# Patient Record
Sex: FEMALE
[De-identification: names, ages, dates, MRNs, and addresses within clinical notes are randomized per-mention and may not be internally consistent; named-entity substitution may affect disease eponyms.]

---

## 2020-06-22 ENCOUNTER — NURSE TRIAGE (OUTPATIENT)
Dept: OTHER | Facility: CLINIC | Age: 50
End: 2020-06-22

## 2020-11-13 ENCOUNTER — NURSE TRIAGE (OUTPATIENT)
Dept: OTHER | Facility: CLINIC | Age: 50
End: 2020-11-13

## 2020-11-13 NOTE — TELEPHONE ENCOUNTER
P[facility]  [facility]  Sore throat, 7/10 pain  Exposed to strep throat one week ago    Reason for Disposition   [1] Sore throat AND [2] strep throat EXPOSURE (i.e., meets definition) within past 10 days    Answer Assessment - Initial Assessment Questions  1. STREP EXPOSURE: \"Was the exposure to someone who lives within your home? \" If not, ask: \"How much contact did you have with the sick individual?\"       Does not live with caller, caller sees them 2-3 times per week  2. ONSET: \"How many days ago did the contact occur? \"       Six days ago  3. PROVEN STREP: Lynnette Benjie you sure the person with strep had a positive throat culture or rapid strep test?\"       yes  4. STREP SYMPTOMS: \"Do YOU have a sore throat, fever, or other symptoms suggestive of strep? \"       Yes, sore throat  5. VIRAL SYMPTOMS: Lynnette Benjie there any symptoms of a cold, such as a runny nose, cough, hoarse voice? \"      Cough and head congestion  6. PREGNANCY: \"Is there any chance you are pregnant? \" \"When was your last menstrual period? \"      no    Protocols used: STREP THROAT EXPOSURE-ADULT-